# Patient Record
Sex: FEMALE | Race: BLACK OR AFRICAN AMERICAN | NOT HISPANIC OR LATINO | URBAN - METROPOLITAN AREA
[De-identification: names, ages, dates, MRNs, and addresses within clinical notes are randomized per-mention and may not be internally consistent; named-entity substitution may affect disease eponyms.]

---

## 2021-01-08 ENCOUNTER — OFFICE VISIT (OUTPATIENT)
Dept: URBAN - METROPOLITAN AREA CLINIC 115 | Facility: CLINIC | Age: 26
End: 2021-01-08

## 2021-02-25 ENCOUNTER — DASHBOARD ENCOUNTERS (OUTPATIENT)
Age: 26
End: 2021-02-25

## 2021-02-26 ENCOUNTER — LAB OUTSIDE AN ENCOUNTER (OUTPATIENT)
Dept: URBAN - METROPOLITAN AREA CLINIC 82 | Facility: CLINIC | Age: 26
End: 2021-02-26

## 2021-02-26 ENCOUNTER — OFFICE VISIT (OUTPATIENT)
Dept: URBAN - METROPOLITAN AREA CLINIC 82 | Facility: CLINIC | Age: 26
End: 2021-02-26

## 2021-02-26 DIAGNOSIS — K83.8 OTHER SPECIFIED DISEASES OF BILIARY TRACT: ICD-10-CM

## 2021-02-26 DIAGNOSIS — R10.13 DYSPEPSIA: ICD-10-CM

## 2021-02-26 DIAGNOSIS — R10.11 RUQ PAIN: ICD-10-CM

## 2021-02-26 DIAGNOSIS — K91.89 OTHER POSTPROCEDURAL COMPLICATIONS AND DISORDERS OF DIGESTIVE SYSTEM: ICD-10-CM

## 2021-02-26 PROBLEM — 162031009: Status: ACTIVE | Noted: 2021-02-26

## 2021-02-26 PROBLEM — 105997008: Status: ACTIVE | Noted: 2021-02-26

## 2021-02-26 PROBLEM — 235931005: Status: ACTIVE | Noted: 2021-02-26

## 2021-02-26 PROCEDURE — 99214 OFFICE O/P EST MOD 30 MIN: CPT | Performed by: INTERNAL MEDICINE

## 2021-02-26 RX ORDER — DICYCLOMINE HYDROCHLORIDE 20 MG/1
1/2 TO 1  TABLET TABLET ORAL THREE TIMES A DAY
Qty: 60 TABLET | Refills: 0 | OUTPATIENT
Start: 2021-02-26 | End: 2021-03-28

## 2021-02-26 NOTE — HPI-TODAY'S VISIT:
Ms. Hamilton is here accompanied by her significant other for evaluation of right upper quadrant pain as well as for stent removal.  She has had a bile leak.  Gallbladder surgery in October 2018.  She has for follow-up of the office visit at that time she has been not scheduled EGD with a stent removal since she did not have insurance.  Patient stated recently she has had having intermittent right upper quadrant pain and discomfort at random.  And also has abdominal bloating gaseous distention.  She currently have a insurance through she is working at the [employer].  Denies any chest pain shortness of breath.  Denies any unintentional weight loss.  Admits for having spicy food and greasy food causing her abdominal crampiness.  Denies any chest pain shortness of breath denies any lower GI symptoms states she does not recall ever having the stent passed into the stool.

## 2021-02-27 LAB
A/G RATIO: 1.3
ALBUMIN: 4.2
ALKALINE PHOSPHATASE: 82
ALT (SGPT): 21
AST (SGOT): 17
BASO (ABSOLUTE): 0
BASOS: 0
BILIRUBIN, TOTAL: <0.2
BUN/CREATININE RATIO: 17
BUN: 13
CALCIUM: 9.3
CARBON DIOXIDE, TOTAL: 21
CHLORIDE: 103
CREATININE: 0.77
EGFR IF AFRICN AM: 125
EGFR IF NONAFRICN AM: 108
EOS (ABSOLUTE): 0.1
EOS: 1
GLOBULIN, TOTAL: 3.3
GLUCOSE: 89
HEMATOCRIT: 39.6
HEMATOLOGY COMMENTS:: (no result)
HEMOGLOBIN: 12.6
IMMATURE CELLS: (no result)
IMMATURE GRANS (ABS): 0
IMMATURE GRANULOCYTES: 0
LYMPHS (ABSOLUTE): 3
LYMPHS: 25
MCH: 28.8
MCHC: 31.8
MCV: 91
MONOCYTES(ABSOLUTE): 0.7
MONOCYTES: 6
NEUTROPHILS (ABSOLUTE): 8.1
NEUTROPHILS: 68
NRBC: (no result)
PLATELETS: 308
POTASSIUM: 4.8
PROTEIN, TOTAL: 7.5
RBC: 4.37
RDW: 12.5
SODIUM: 138
WBC: 11.9

## 2021-03-15 PROBLEM — 301717006: Status: ACTIVE | Noted: 2021-02-26

## 2021-03-18 ENCOUNTER — OFFICE VISIT (OUTPATIENT)
Dept: URBAN - METROPOLITAN AREA SURGERY CENTER 13 | Facility: SURGERY CENTER | Age: 26
End: 2021-03-18